# Patient Record
Sex: FEMALE | Race: WHITE | NOT HISPANIC OR LATINO | Employment: UNEMPLOYED | URBAN - METROPOLITAN AREA
[De-identification: names, ages, dates, MRNs, and addresses within clinical notes are randomized per-mention and may not be internally consistent; named-entity substitution may affect disease eponyms.]

---

## 2018-02-16 ENCOUNTER — OFFICE VISIT (OUTPATIENT)
Dept: INTERNAL MEDICINE | Facility: CLINIC | Age: 28
End: 2018-02-16

## 2018-02-16 VITALS
SYSTOLIC BLOOD PRESSURE: 112 MMHG | BODY MASS INDEX: 27.44 KG/M2 | DIASTOLIC BLOOD PRESSURE: 72 MMHG | HEIGHT: 69 IN | WEIGHT: 185.25 LBS | OXYGEN SATURATION: 99 % | HEART RATE: 68 BPM

## 2018-02-16 DIAGNOSIS — G62.9 PERIPHERAL POLYNEUROPATHY: ICD-10-CM

## 2018-02-16 DIAGNOSIS — R51.9 NONINTRACTABLE EPISODIC HEADACHE, UNSPECIFIED HEADACHE TYPE: Primary | ICD-10-CM

## 2018-02-16 PROCEDURE — 99214 OFFICE O/P EST MOD 30 MIN: CPT | Performed by: NURSE PRACTITIONER
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
## 2018-03-01 ENCOUNTER — HOSPITAL ENCOUNTER (OUTPATIENT)
Dept: INFUSION THERAPY | Facility: HOSPITAL | Age: 28
Discharge: HOME | End: 2018-03-01
Attending: PSYCHIATRY & NEUROLOGY | Admitting: PSYCHIATRY & NEUROLOGY

## 2018-03-01 DIAGNOSIS — G62.9 PERIPHERAL POLYNEUROPATHY: ICD-10-CM

## 2018-03-01 PROCEDURE — 95886 MUSC TEST DONE W/N TEST COMP: CPT

## 2018-03-01 PROCEDURE — 95886 MUSC TEST DONE W/N TEST COMP: CPT | Performed by: PSYCHIATRY & NEUROLOGY

## 2018-03-01 PROCEDURE — 95911 NRV CNDJ TEST 9-10 STUDIES: CPT | Performed by: PSYCHIATRY & NEUROLOGY

## 2018-03-01 PROCEDURE — 95911 NRV CNDJ TEST 9-10 STUDIES: CPT

## 2018-03-01 NOTE — PROCEDURES
EMG and Nerve Conduction Studies    Please see data sheets for details on methods, temperatures and lab standards. EMG muscles tested for upper extremity studies include the deltoid, biceps, triceps, pronator teres, extensor digitorum communis, first dorsal interosseous and abductor pollicis brevis.  EMG muscles tested for lower extremity studies include the vastus lateralis, tibialis anterior, peroneus longus, medial gastrocnemius and extensor digitorum brevis.  Additional muscles tested as needed.  Paraspinal muscles tested as needed.  The complete report includes the data sheets.    Indication: Numbness and tingling in the arms particularly with certain shoulder positions  History: 27-year-old white female with numbness and tingling in her arms especially with arms up past horizontal with associated shoulder pain.  She wakes with her arms numb frequently      Ht: 68.5 inches  Wt: 185 pounds  HbA1C: No results found for: HGBA1C  TSH: No results found for: TSH    Technical summary:  Nerve conduction studies were obtained in both arms.  Hands were cold prior to study and so there were warmed in the sink.  Skin temperature was at least 32°C measured on each palm.  Needle examination was obtained on selected muscles in both arms.    Results:  1.  Normal median sensory studies bilaterally.  2.  Normal ulnar sensory studies bilaterally.  3.  Normal right radial sensory study.  4.  Normal median motor studies bilaterally.  5.  Normal ulnar motor studies bilaterally.  6.  Normal needle examination of selected muscles in both arms.    Impression:  Normal study.  No evidence of a median or ulnar neuropathy or cervical radiculopathy on either side by this study.  In addition the standard nerve conductions for a lower trunk plexopathy showed normal median motor amplitudes and normal ulnar sensory amplitudes arguing against lower trunk plexopathies on either side.  Study results were discussed with the patient.    Néstor Woods  M.D.      Addendum: With shoulders fully retracted both radial pulses disappeared.  Other shoulder positions on the right did not occlude the pulse.  On the left with arm vertical or with shoulder externally rotated the radial pulse disappeared which may go along with vascular thoracic outlet syndrome.        Dictated utilizing Dragon dictation.

## 2018-03-13 ENCOUNTER — OFFICE VISIT (OUTPATIENT)
Dept: FAMILY MEDICINE CLINIC | Facility: CLINIC | Age: 28
End: 2018-03-13

## 2018-03-13 VITALS
RESPIRATION RATE: 16 BRPM | HEIGHT: 69 IN | BODY MASS INDEX: 27.11 KG/M2 | WEIGHT: 183 LBS | HEART RATE: 75 BPM | TEMPERATURE: 97.5 F | OXYGEN SATURATION: 99 % | DIASTOLIC BLOOD PRESSURE: 78 MMHG | SYSTOLIC BLOOD PRESSURE: 120 MMHG

## 2018-03-13 DIAGNOSIS — R20.0 LOWER EXTREMITY NUMBNESS: ICD-10-CM

## 2018-03-13 DIAGNOSIS — R20.0 NUMBNESS OF UPPER EXTREMITY: ICD-10-CM

## 2018-03-13 DIAGNOSIS — R29.898 WEAKNESS OF BOTH LOWER EXTREMITIES: Primary | ICD-10-CM

## 2018-03-13 PROCEDURE — 99214 OFFICE O/P EST MOD 30 MIN: CPT | Performed by: FAMILY MEDICINE

## 2018-03-13 PROCEDURE — 72040 X-RAY EXAM NECK SPINE 2-3 VW: CPT | Performed by: FAMILY MEDICINE

## 2018-03-13 PROCEDURE — 72100 X-RAY EXAM L-S SPINE 2/3 VWS: CPT | Performed by: FAMILY MEDICINE

## 2018-03-13 NOTE — PATIENT INSTRUCTIONS
Paresthesia  Paresthesia is a burning or prickling feeling. This feeling can happen in any part of the body. It often happens in the hands, arms, legs, or feet. Usually, it is not painful. In most cases, the feeling goes away in a short time and is not a sign of a serious problem.  Follow these instructions at home:  · Avoid drinking alcohol.  · Try massage or needle therapy (acupuncture) to help with your problems.  · Keep all follow-up visits as told by your doctor. This is important.  Contact a doctor if:  · You keep on having episodes of paresthesia.  · Your burning or prickling feeling gets worse when you walk.  · You have pain or cramps.  · You feel dizzy.  · You have a rash.  Get help right away if:  · You feel weak.  · You have trouble walking or moving.  · You have problems speaking, understanding, or seeing.  · You feel confused.  · You cannot control when you pee (urinate) or poop (bowel movement).  · You lose feeling (numbness) after an injury.  · You pass out (faint).  This information is not intended to replace advice given to you by your health care provider. Make sure you discuss any questions you have with your health care provider.  Document Released: 11/30/2009 Document Revised: 05/25/2017 Document Reviewed: 12/14/2015  Elsevier Interactive Patient Education © 2017 [employer]
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
## 2018-03-21 ENCOUNTER — HOSPITAL ENCOUNTER (OUTPATIENT)
Dept: MRI IMAGING | Facility: HOSPITAL | Age: 28
Discharge: HOME | End: 2018-03-21
Admitting: FAMILY MEDICINE

## 2018-03-21 ENCOUNTER — HOSPITAL ENCOUNTER (OUTPATIENT)
Dept: MRI IMAGING | Facility: HOSPITAL | Age: 28
Discharge: HOME | End: 2018-03-21

## 2018-03-21 DIAGNOSIS — R20.0 LOWER EXTREMITY NUMBNESS: ICD-10-CM

## 2018-03-21 DIAGNOSIS — R20.0 NUMBNESS OF UPPER EXTREMITY: ICD-10-CM

## 2018-03-21 DIAGNOSIS — R29.898 WEAKNESS OF BOTH LOWER EXTREMITIES: ICD-10-CM

## 2018-03-21 PROCEDURE — 72148 MRI LUMBAR SPINE W/O DYE: CPT

## 2018-03-21 PROCEDURE — 72141 MRI NECK SPINE W/O DYE: CPT

## 2018-03-22 ENCOUNTER — APPOINTMENT (OUTPATIENT)
Dept: MRI IMAGING | Facility: HOSPITAL | Age: 28
End: 2018-03-22

## 2018-03-27 DIAGNOSIS — R20.0 NUMBNESS AND TINGLING OF BOTH LOWER EXTREMITIES: Primary | ICD-10-CM

## 2018-03-27 DIAGNOSIS — R29.898 WEAKNESS OF BOTH LOWER LIMBS: ICD-10-CM

## 2018-03-27 DIAGNOSIS — R20.2 NUMBNESS AND TINGLING OF BOTH LOWER EXTREMITIES: Primary | ICD-10-CM

## 2018-04-06 ENCOUNTER — HOSPITAL ENCOUNTER (OUTPATIENT)
Dept: MRI IMAGING | Facility: HOSPITAL | Age: 28
Discharge: HOME | End: 2018-04-06
Admitting: FAMILY MEDICINE

## 2018-04-06 DIAGNOSIS — R20.2 NUMBNESS AND TINGLING OF BOTH LOWER EXTREMITIES: ICD-10-CM

## 2018-04-06 DIAGNOSIS — R20.0 NUMBNESS AND TINGLING OF BOTH LOWER EXTREMITIES: ICD-10-CM

## 2018-04-06 DIAGNOSIS — R29.898 WEAKNESS OF BOTH LOWER LIMBS: ICD-10-CM

## 2018-04-06 PROCEDURE — 72146 MRI CHEST SPINE W/O DYE: CPT

## 2018-04-09 DIAGNOSIS — R20.0 NUMBNESS AND TINGLING: ICD-10-CM

## 2018-04-09 DIAGNOSIS — R20.2 NUMBNESS AND TINGLING: ICD-10-CM

## 2018-04-09 DIAGNOSIS — R29.898 WEAKNESS OF LOWER EXTREMITY, UNSPECIFIED LATERALITY: Primary | ICD-10-CM

## 2018-04-25 NOTE — PROGRESS NOTES
Subjective   Patient ID: Esthela Echevarria is a 27 y.o. female is being seen for consultation today at the request of Ramon Odonnell MD for back pain, numbness and tingling in bilateral upper extremity's    History of Present Illness    This patient has been having pain and numbness in both her arms and her legs as well as pain in her back for some time.  She had an MRI of her lumbar spine in 2016 and at that time was told that she had a disc herniation at L4 5.  Subsequent to that her symptoms gotten worse over the ensuing 2 years.  This particular set of symptoms has been getting worse since the first of this year.  The pain is located in her neck, her thoracic spine, and her lumbar spine.  She also has numbness and tingling in her arms and her hands and in both of her legs but worse on the right than the left.  She is also noticed some numbness in her perineum and says that during sexual relations she doesn't have a lot of feeling.  She doesn't have any difficulty with bowel and bladder control.  All of this is been going on since at least the first of this year.  Activity seems to make her pain worse as well as her numbness and tingling.  She has had no specific treatment for this.    The following portions of the patient's history were reviewed and updated as appropriate: allergies, current medications, past family history, past medical history, past social history, past surgical history and problem list.    Review of Systems   Constitutional: Positive for appetite change.   Respiratory: Negative for chest tightness and shortness of breath.    Cardiovascular: Negative for chest pain.   Gastrointestinal: Positive for constipation.   Endocrine: Positive for cold intolerance.   Musculoskeletal: Positive for arthralgias, back pain, gait problem, myalgias, neck pain and neck stiffness.   Allergic/Immunologic: Positive for environmental allergies.   Neurological: Positive for dizziness, light-headedness, numbness and  headaches.   Hematological: Bruises/bleeds easily.   Psychiatric/Behavioral: Positive for agitation, dysphoric mood and sleep disturbance. The patient is nervous/anxious.    All other systems reviewed and are negative.      Objective   Physical Exam   Constitutional: She is oriented to person, place, and time. She appears well-developed and well-nourished.   HENT:   Head: Normocephalic and atraumatic.   Eyes: Conjunctivae and EOM are normal. Pupils are equal, round, and reactive to light.   Fundoscopic exam:       The right eye shows no papilledema. The right eye shows venous pulsations.        The left eye shows no papilledema. The left eye shows venous pulsations.   Neck: Carotid bruit is not present.   Neurological: She is oriented to person, place, and time. She has a normal Finger-Nose-Finger Test and a normal Heel to Shin Test. Gait normal.   Reflex Scores:       Tricep reflexes are 2+ on the right side and 2+ on the left side.       Bicep reflexes are 2+ on the right side and 2+ on the left side.       Brachioradialis reflexes are 2+ on the right side and 2+ on the left side.       Patellar reflexes are 2+ on the right side and 2+ on the left side.       Achilles reflexes are 2+ on the right side and 2+ on the left side.  Psychiatric: Her speech is normal.     Neurologic Exam     Mental Status   Oriented to person, place, and time.   Registration of memory: Good recent and remote memory.   Attention: normal. Concentration: normal.   Speech: speech is normal   Level of consciousness: alert  Knowledge: consistent with education.     Cranial Nerves     CN II   Visual fields full to confrontation.   Visual acuity: normal    CN III, IV, VI   Pupils are equal, round, and reactive to light.  Extraocular motions are normal.     CN V   Facial sensation intact.   Right corneal reflex: normal  Left corneal reflex: normal    CN VII   Facial expression full, symmetric.   Right facial weakness: none  Left facial weakness:  none    CN VIII   Hearing: intact    CN IX, X   Palate: symmetric    CN XI   Right sternocleidomastoid strength: normal  Left sternocleidomastoid strength: normal    CN XII   Tongue: not atrophic  Tongue deviation: none    Motor Exam   Muscle bulk: normal  Right arm tone: normal  Left arm tone: normal  Right leg tone: normal  Left leg tone: normal    Strength   Strength 5/5 except as noted.     Sensory Exam   Light touch normal.     Gait, Coordination, and Reflexes     Gait  Gait: normal    Coordination   Finger to nose coordination: normal  Heel to shin coordination: normal    Reflexes   Right brachioradialis: 2+  Left brachioradialis: 2+  Right biceps: 2+  Left biceps: 2+  Right triceps: 2+  Left triceps: 2+  Right patellar: 2+  Left patellar: 2+  Right achilles: 2+  Left achilles: 2+  Right : 2+  Left : 2+      Assessment/Plan   Independent Review of Radiographic Studies:      I reviewed an MRI of her thoracic spine, cervical spine, and lumbar spine all done earlier this year myself.  The MRI of the thoracic spine was done on 6 April.  This does show some disc bulging at several levels but there is a fairly large disc herniation at T11-T12 with some cord compression.  There is several other levels where there is some disc bulging to one side or the other but no significant compression of the neural elements.  The disc at T11 T12 is a little more to the left.  The cervical MRI shows some disc bulging at several levels and some disc degeneration but no significant compression of the neural elements.  Both this study and the lumbar study were done on 21 March.  The lumbar MRI also shows multiple levels of disc bulging.  There is a central disc bulge at L3 4 causing fairly significant spinal stenosis and a disc bulge to the right side at L4 5.  L5-S1 also shows some central narrowing.    I also reviewed an MRI of her lumbar spine that is in the system that was done in December 2016.  I compared it directly to  the MRI that was done last month.  The disc at T11-T12 can be seen on both scans and looks to be about the same as it was in 2016.  There was also a disc herniation to the right side at L4 5 which seems to gotten smaller on this one.  This has gotten worse on the left however.    Medical Decision Making:      I told the patient and her  about the exam and the MRI.  I am not sure why she has numbness in her hands and her arms because her cervical MRI is pretty normal.  On the other hand I am very concerned that the perineal numbness may be coming from cord compression at T11-T12.  I explained to them that I did not do a perineal exam as we are not set up for that in this office.  Also I don't think it would add much to what we already know.  I suggested that it makes some sense to go ahead and do a total spine myelogram at this point just to see if the compression at T11-T12 is really as bad as it appears to be on the MRI.  If so then I think she will require surgery at that level.  If not then we might avoid it and we might find another reason for the problem on the lumbar portion of the MRI.  Since the numbness in the hands and arms is not explained on the MRI I think we need to look at the neck as well.    I told the patient what a myelogram involves.  I explained that there is a 50% chance of developing a bad headache and nausea as a result of the test.  I explained that there is also a very small chance of infection, seizures, and bleeding.  I explained how we would treat a post myelogram headache including bedrest, caffeinated fluids, steroids, and blood patch.  The patient does ask to proceed.    Esthela was seen today for back pain and neck pain.    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

## 2018-04-26 ENCOUNTER — OFFICE VISIT (OUTPATIENT)
Dept: NEUROSURGERY | Facility: CLINIC | Age: 28
End: 2018-04-26

## 2018-04-26 VITALS
HEART RATE: 84 BPM | SYSTOLIC BLOOD PRESSURE: 123 MMHG | DIASTOLIC BLOOD PRESSURE: 83 MMHG | WEIGHT: 183 LBS | BODY MASS INDEX: 26.2 KG/M2 | HEIGHT: 70 IN

## 2018-04-26 DIAGNOSIS — M51.04 INTERVERTEBRAL DISC DISORDER OF THORACIC REGION WITH MYELOPATHY: ICD-10-CM

## 2018-04-26 DIAGNOSIS — M48.061 SPINAL STENOSIS OF LUMBAR REGION WITHOUT NEUROGENIC CLAUDICATION: ICD-10-CM

## 2018-04-26 DIAGNOSIS — M54.12 CERVICAL RADICULITIS: Primary | ICD-10-CM

## 2018-04-26 PROCEDURE — 99245 OFF/OP CONSLTJ NEW/EST HI 55: CPT | Performed by: NEUROLOGICAL SURGERY

## 2018-04-26 RX ORDER — DEXAMETHASONE 4 MG/1
TABLET ORAL
Qty: 2 TABLET | Refills: 0 | Status: SHIPPED | OUTPATIENT
Start: 2018-04-26 | End: 2018-05-08 | Stop reason: HOSPADM

## 2018-05-08 ENCOUNTER — HOSPITAL ENCOUNTER (OUTPATIENT)
Dept: CT IMAGING | Facility: HOSPITAL | Age: 28
Discharge: HOME | End: 2018-05-08
Attending: NEUROLOGICAL SURGERY | Admitting: NEUROLOGICAL SURGERY

## 2018-05-08 ENCOUNTER — HOSPITAL ENCOUNTER (OUTPATIENT)
Dept: GENERAL RADIOLOGY | Facility: HOSPITAL | Age: 28
Discharge: HOME | End: 2018-05-08
Attending: NEUROLOGICAL SURGERY

## 2018-05-08 VITALS
SYSTOLIC BLOOD PRESSURE: 102 MMHG | DIASTOLIC BLOOD PRESSURE: 62 MMHG | HEART RATE: 63 BPM | WEIGHT: 185 LBS | BODY MASS INDEX: 27.4 KG/M2 | HEIGHT: 69 IN | OXYGEN SATURATION: 98 % | TEMPERATURE: 99.1 F | RESPIRATION RATE: 18 BRPM

## 2018-05-08 DIAGNOSIS — M48.061 SPINAL STENOSIS OF LUMBAR REGION WITHOUT NEUROGENIC CLAUDICATION: ICD-10-CM

## 2018-05-08 DIAGNOSIS — M51.04 INTERVERTEBRAL DISC DISORDER OF THORACIC REGION WITH MYELOPATHY: ICD-10-CM

## 2018-05-08 DIAGNOSIS — M54.12 CERVICAL RADICULITIS: ICD-10-CM

## 2018-05-08 PROCEDURE — 72052 X-RAY EXAM NECK SPINE 6/>VWS: CPT

## 2018-05-08 PROCEDURE — 25010000002 IOPAMIDOL 61 % SOLUTION: Performed by: NEUROLOGICAL SURGERY

## 2018-05-08 PROCEDURE — 72128 CT CHEST SPINE W/O DYE: CPT

## 2018-05-08 PROCEDURE — 72270 MYELOGPHY 2/> SPINE REGIONS: CPT

## 2018-05-08 PROCEDURE — 72114 X-RAY EXAM L-S SPINE BENDING: CPT

## 2018-05-08 PROCEDURE — 72125 CT NECK SPINE W/O DYE: CPT

## 2018-05-08 PROCEDURE — 72131 CT LUMBAR SPINE W/O DYE: CPT

## 2018-05-08 RX ORDER — ACETAMINOPHEN 325 MG/1
650 TABLET ORAL EVERY 4 HOURS PRN
Status: DISCONTINUED | OUTPATIENT
Start: 2018-05-08 | End: 2018-05-09 | Stop reason: HOSPADM

## 2018-05-08 RX ORDER — HYDROCODONE BITARTRATE AND ACETAMINOPHEN 5; 325 MG/1; MG/1
1 TABLET ORAL EVERY 4 HOURS PRN
Status: DISCONTINUED | OUTPATIENT
Start: 2018-05-08 | End: 2018-05-09 | Stop reason: HOSPADM

## 2018-05-08 RX ORDER — LIDOCAINE HYDROCHLORIDE 10 MG/ML
10 INJECTION, SOLUTION INFILTRATION; PERINEURAL ONCE
Status: COMPLETED | OUTPATIENT
Start: 2018-05-08 | End: 2018-05-08

## 2018-05-08 RX ADMIN — LIDOCAINE HYDROCHLORIDE 8 ML: 10 INJECTION, SOLUTION INFILTRATION; PERINEURAL at 07:47

## 2018-05-08 RX ADMIN — ACETAMINOPHEN 650 MG: 325 TABLET ORAL at 09:21

## 2018-05-08 RX ADMIN — IOPAMIDOL 15 ML: 612 INJECTION, SOLUTION INTRATHECAL at 07:49

## 2018-05-08 NOTE — DISCHARGE INSTRUCTIONS
EDUCATION /DISCHARGE INSTRUCTIONS:  A myelogram is a special radiology procedure of the spinal cord, spinal nerves and other related structures.  You will be awake during the examination.  An area of your lower back will be cleansed with an antiseptic solution.  The physician will inject a numbing medication in your lower back.  While your back is numb, a needle will be placed in the lower back area.  A small amount of spinal fluid may be withdrawn and sent to the lab if ordered by your physician. While the needle is in the back, an injection of a contrast material (xray dye) will be given through the needle.  The contrast material will allow the physician to see the spinal cord and spinal nerves.  Once injected, the needle will be removed and a band aid will be placed over the injection site.  The table will be tilted during the process to allow the contrast material to flow to particular areas in the spine.  Following the injection and xrays, you will be taken to the CT scan where more pictures will be taken. After the procedure is finished, the contrast material will be absorbed by your body and eliminated through your kidneys.  The radiologist will study and interpret your myelogram and send the results to your physician.    Procedure risks of a myelogram include, but are not limited to:  *  Bleeding   *  seizure  *  Infection   *  Headache, possibly severe requiring  *  Contrast reaction      a blood patch  *  Nerve or cord injury  *  Paralysis and death    Benefits of the procedure:  *  Best examination for delineating pathology related to spinal cord compression from a disc and/or nerve root compression    Alternatives to the procedure:  MRI - a non invasive procedure requiring intravenous contrast injection.  Cannot be done on patients with certain pacemakers or metal in the body.  MRI risks include possible reaction to the contrast material, movement of metal located in the body.  Benefit to MRI:   Non-invasive and usually painless procedure.  THIS EDUCATION INFORMATION WAS REVIEWED PRIOR TO PROCEDURE AND CONSENT. Patient initials________________Time__0710________________    Important information following your myelogram:  *  Lie down with your head elevated no more than 2 pillows high today & tonight  *  Tomorrow, lie down with 1 pillow all day and all night  *  Sit up to eat meals and use the bathroom, otherwise, lie down  *  Do not drive for 48 hours following a myelogram  *  You may remove the bandage and shower in the morning  *  Increase your fluids for the next 24 hours.  Caffeinated drinks are encouraged.      Resume taking blood thinner or aspirin on _No Aspirin for 24 hours after the myelogram_    CALL YOUR PHYSICIAN FOR THE FOLLOWING:  * Pain at the injections site  * Reddness, swelling, bruising or drainage at the injection site.  * A fever by mouth of 101.0  * Any new symptoms    Headaches are a common side effect after a myelogram.  If you get a headache, you should stay flat in bed and drink plenty of fluids. If the headache persist and does not go away with rest/medication, CALL Dr. Garces at [phone]

## 2018-05-09 ENCOUNTER — TELEPHONE (OUTPATIENT)
Dept: INTERVENTIONAL RADIOLOGY/VASCULAR | Facility: HOSPITAL | Age: 28
End: 2018-05-09

## 2018-05-18 NOTE — PROGRESS NOTES
Subjective   Patient ID: Esthela Echevarria is a 27 y.o. female is here today for follow-up with a new Total Spine Myelogram ordered for neck pain, back pain, numbness and tingling in bilateral upper extremity's.    History of Present Illness    This patient returns today.  She is doing well overall.  She does still have some pain but it is reasonably tolerable at this point.  She does still have the perineal numbness.    The following portions of the patient's history were reviewed and updated as appropriate: allergies, current medications, past family history, past medical history, past social history, past surgical history and problem list.    Review of Systems   Constitutional: Positive for activity change.   Respiratory: Negative for chest tightness and shortness of breath.    Cardiovascular: Negative for chest pain.   Musculoskeletal: Positive for back pain, gait problem, myalgias, neck pain and neck stiffness.   Neurological: Positive for dizziness, numbness and headaches.        Tingling   All other systems reviewed and are negative.      Objective   Physical Exam   Constitutional: She is oriented to person, place, and time. She appears well-developed and well-nourished.   Neurological: She is oriented to person, place, and time.     Neurologic Exam     Mental Status   Oriented to person, place, and time.       Assessment/Plan   Independent Review of Radiographic Studies:      I reviewed her plain films, myelogram, and CT scan myself.  He was imaging of her entire spine.  The plain films or in the cervical and lumbar spine.  In the myelogram and CT's or of the entire spine.  On the myelogram itself there is a conjoined nerve root on the right side at L4 5 but the nerves all fill out pretty well.  On the post myelographic CT scan of the cervical spine C2 3, C3 4, C4 5, C5 6, and C6 7 all look okay as does C7-T1.  There are degenerative changes but no evidence of neural compression.  On the post myelographic CT of  the thoracic spine there is a disc herniation on the left side at T3 4 but it is not compressing the cord.  There are degenerative changes at several levels and a little flattening of the cord at T6 7 from disc bulging and spondylitic change.  This is also true at T7-T8.  There is a central disc bulge at T11-T12 which is distorting the lower cord but not compressing it.  There is a disc herniation to the right side at T12-L1.  L1 2 and L2-3 look okay.  L3 4 shows some central disc bulging to the left side which is compressing the thecal sac and L4 5 shows a central disc bulge as well with a little lateral recess stenosis.  L5-S1 mostly looks okay.    Medical Decision Making:      I told the patient and her  about the test.  I told them that from my point of view I would not recommend any surgery.  The perineal numbness may be from the disc at T11-T12 but I don't think it is dangerous and I don't think it is putting her at risk of cauda equina syndrome.  I did suggest that she try some physical therapy.  I told her to call me if anything gets worse I told her to be very careful about gaining weight.  She will call if anything gets worse.    Esthela was seen today for back pain and neck pain.    Diagnoses and all orders for this visit:    Spinal stenosis of lumbar region without neurogenic claudication  -     Ambulatory Referral to Physical Therapy      Return if symptoms worsen or fail to improve.

## 2018-05-22 ENCOUNTER — OFFICE VISIT (OUTPATIENT)
Dept: ORTHOPEDIC SURGERY | Facility: CLINIC | Age: 28
End: 2018-05-22

## 2018-05-22 DIAGNOSIS — M54.6 CHRONIC THORACIC BACK PAIN, UNSPECIFIED BACK PAIN LATERALITY: ICD-10-CM

## 2018-05-22 DIAGNOSIS — M25.50 ARTHRALGIA OF MULTIPLE SITES: Primary | ICD-10-CM

## 2018-05-22 DIAGNOSIS — M48.061 SPINAL STENOSIS OF LUMBAR REGION WITHOUT NEUROGENIC CLAUDICATION: ICD-10-CM

## 2018-05-22 DIAGNOSIS — G89.29 CHRONIC THORACIC BACK PAIN, UNSPECIFIED BACK PAIN LATERALITY: ICD-10-CM

## 2018-05-22 DIAGNOSIS — M47.22 CERVICAL SPONDYLOSIS WITH RADICULOPATHY: ICD-10-CM

## 2018-05-22 PROCEDURE — 99205 OFFICE O/P NEW HI 60 MIN: CPT | Performed by: ORTHOPAEDIC SURGERY

## 2018-05-22 NOTE — PROGRESS NOTES
New patient or new problem visit    Chief Complaint   Patient presents with   • Cervical Spine - Establish Care, Numbness   • Lumbar Spine - Establish Care, Numbness       HPI: She is seen today request of Dr. Garces primarily for lumbar back pain.  Was tremendously worsened in the peripartum period of the delivering her second child who is now 8 months old pain is been ongoing for about 2 years.  Early in the pregnancy she was actually better.  The pain radiates proximally and distally from the lumbar spine she notes numbness and tingling in the arms and legs and has also complaint of chronic migraines.  Her pain varies from mild to severe intermittent constant and is grinding stabbing aching and burning and worse with activity.  She is tried anti-inflammatory agents ice heat and rest PT in yoga all without relief.    PFSH: See chart- reviewed.  She is seemingly otherwise healthy and there appears to be a family history of some early back complaints.    Review of Systems   HENT: Positive for postnasal drip.    Musculoskeletal: Positive for arthralgias and myalgias.   Neurological: Positive for dizziness, numbness and headaches.   Psychiatric/Behavioral: Positive for sleep disturbance. The patient is nervous/anxious.        PE: Constitutional: Vital signs above-noted.  Awake, alert and oriented she appears to be of average size and satisfactory fitness    Psychiatric: Affect and insight do not appear grossly disturbed.    Pulmonary: Breathing is unlabored, color is good.    Skin: Warm, dry and normal turgor    Cardiac:  pedal pulses intact.  No edema.    Eyesight and hearing appear adequate for examination purposes      Musculoskeletal:  There is mild tenderness to percussion and palpation of the spine. Motion appears somewhat stiff.  Posture is unremarkable to coronal and sagittal inspection.    The skin about the area is intact.  There is no palpable or visible deformity.  There is no local spasm.       Neurologic:    Reflexes are 2+ and symmetrical in the patellae and achilles.   Motor function is undisturbed in quadriceps, EHL, and gastrocnemius      Sensation appears symmetrically intact to light touch   .  In the bilateral lower extremities there is no evidence of atrophy.   Clonus is absent..  Gait appears undisturbed.  SLR test negative      MEDICAL DECISION MAKING    XRAY: There is a plethora of x-rays: Plain film x-rays of the cervical spine are normal.  I agree with the radiologist report.  Plain film x-rays the lumbar spine show some retrolisthesis at L4 5 and a tiny bit at L3 4.  The radiologist did not mention the amount at L3 4.  No instability on bending films.  Myelogram and CT scan demonstrate the T7 8 disc prominence and abutment against the cord, and then some T11 12 moderate stenosis and cord abutment with the slight deformity of the cord.  Cervical myelogram CT is fairly unremarkable.  Lumbar demonstrates moderate stenosis at L3-4 and L4-5 and looks good otherwise.  I agree with the radiologist reports on these.  Finally MRI scans of the cervical thoracic and lumbar spine are reviewed.  Cervical spine again is fairly unremarkable.  Thoracic spine shows multiple levels of disc protrusion of mild extent and then a moderate stenosis at T11 12 but without evidence of the cord signal change.  At L3-4 and L4-5 there is T2 diminished signal intensity change the midst otherwise fairly normal looking disc and then there is a moderate stenosis at L3-4 and L4-5.  I reviewed the radiologist's report which is lengthy and seems to agree with my interpretation.    Other: n/a    Impression: This is a otherwise healthy 27-year-old who takes an interest in an responsibility for her own health.  Unfortunately she has inherited remarkable degenerative disc disease which is causing multiple thoracic and lumbar manifestations.  Her recent pregnancies and elevated the hormones area and have probably contributed to further  exacerbation of her symptoms.  For now she has some quality in her life, and all her symptoms are daily aggravating and annoying and prevent her from enjoying normal 27-year-old activities.  She has moderate stenosis in the lower thoracic and then again at the L3-4 L4 5 area.  Probably the likelihood for dramatic neurologic deterioration isn't much different from the small risk of serious complication from spine surgery intervention as well as starting down the road toward further, inevitable adjacent level spinal interventions.    Plan: For now continue cardiovascular activity, call me or Dr. Garces for any neurologic worsening or intractable back pain, and I will check a panel including rheumatoid factor, A N/A uric acid and sedimentation rate as she has some other musculoskeletal mild complaints which may be part of a larger inflammatory background.  At some point if surgery is selected I suppose starting with L3-4 and L4-5 decompression and fusion would be best.

## 2018-05-31 ENCOUNTER — OFFICE VISIT (OUTPATIENT)
Dept: NEUROSURGERY | Facility: CLINIC | Age: 28
End: 2018-05-31

## 2018-05-31 VITALS — HEART RATE: 89 BPM | SYSTOLIC BLOOD PRESSURE: 130 MMHG | DIASTOLIC BLOOD PRESSURE: 80 MMHG

## 2018-05-31 DIAGNOSIS — M48.061 SPINAL STENOSIS OF LUMBAR REGION WITHOUT NEUROGENIC CLAUDICATION: Primary | ICD-10-CM

## 2018-05-31 PROBLEM — M51.04 INTERVERTEBRAL DISC DISORDER OF THORACIC REGION WITH MYELOPATHY: Status: RESOLVED | Noted: 2018-04-26 | Resolved: 2018-05-31

## 2018-05-31 PROCEDURE — 99213 OFFICE O/P EST LOW 20 MIN: CPT | Performed by: NEUROLOGICAL SURGERY

## 2018-10-19 DIAGNOSIS — M25.50 ARTHRALGIA OF MULTIPLE SITES: ICD-10-CM

## 2018-10-19 DIAGNOSIS — M25.50 ARTHRALGIA OF MULTIPLE SITES: Primary | ICD-10-CM

## 2018-10-22 LAB
ANA TITR SER IF: POSITIVE {TITER}
ERYTHROCYTE [SEDIMENTATION RATE] IN BLOOD BY WESTERGREN METHOD: 4 MM/HR (ref 0–20)
Lab: NORMAL
MITOTIC SPINDLE APP AB TITR SERPL IF: NORMAL {TITER}
RHEUMATOID FACT SERPL-ACNC: <10 IU/ML (ref 0–13.9)
URATE SERPL-MCNC: 6.4 MG/DL (ref 2.4–5.7)

## 2018-10-24 ENCOUNTER — TELEPHONE (OUTPATIENT)
Dept: ORTHOPEDIC SURGERY | Facility: CLINIC | Age: 28
End: 2018-10-24

## 2018-10-24 DIAGNOSIS — M25.50 ARTHRALGIA OF MULTIPLE JOINTS: Primary | ICD-10-CM

## 2018-10-24 NOTE — TELEPHONE ENCOUNTER
Patient called requesting results of labs drawn 10/19/18.     Informed [facility] out of office till 10/30. Unsure if she needs FU appt or if results can be discussed via phone. Patient can be reached at [phone]. Thanks /srh

## 2018-10-25 NOTE — TELEPHONE ENCOUNTER
Spoken with the patient regarding her lab results.  Sedimentation rate and rheumatoid factor were normal however her NA was positive with a ratio of 1:80.  Uric acid was also slightly high at 6.4.  Have discussed with the patient that she could follow-up with her PCP but may eventually end up needing to see a rheumatologist.  And would prefer to wait until ABIDA ALEMAN return to the office to talk to him about what he prefers.  Patient also inquired about whether or not she needed to have thyroid studies done since she does have a family history of thyroid disease.  Cussed with ABIDA ALEMAN when he returns to the office and then get back with the patient

## 2018-10-29 NOTE — TELEPHONE ENCOUNTER
Call return to the patient.  Have advised her that ABIDA ALEMAN has reviewed her labs and would recommend that she see a rheumatologist.  She can either follow-up with the rheumatologist or her PCP regarding her thyroid levels.  Patient would prefer to go ahead and make the appointment with the rheumatologist.  Referral has been ordered

## 2018-10-29 NOTE — TELEPHONE ENCOUNTER
She may eventually need spinal surgery but for now get her in to see a rheumatologist.  She should discuss the thyroid function tests with the rheumatologist or her family md.

## 2018-12-27 ENCOUNTER — TELEPHONE (OUTPATIENT)
Dept: FAMILY MEDICINE CLINIC | Facility: CLINIC | Age: 28
End: 2018-12-27

## 2018-12-27 RX ORDER — FLUCONAZOLE 150 MG/1
150 TABLET ORAL ONCE
Qty: 2 TABLET | Refills: 0 | Status: SHIPPED | OUTPATIENT
Start: 2018-12-27 | End: 2018-12-27

## 2018-12-27 NOTE — TELEPHONE ENCOUNTER
I have sent in the prescription for her.  If it does not improve, please make an appointment with me.

## 2018-12-27 NOTE — TELEPHONE ENCOUNTER
Patient called she has a yeast infection and she was hoping Dr Odonnell would call in something to [facility] [phone], her best call back is [phone]

## 2020-04-08 ENCOUNTER — DOCUMENTATION (OUTPATIENT)
Dept: NEUROSURGERY | Facility: CLINIC | Age: 30
End: 2020-04-08

## 2020-04-08 NOTE — PROGRESS NOTES
[facility] faxed request for all Medical Records for this patient. All records pertaining to Dr. Garces was faxed to requested number.

## 2021-04-16 ENCOUNTER — BULK ORDERING (OUTPATIENT)
Dept: CASE MANAGEMENT | Facility: OTHER | Age: 31
End: 2021-04-16

## 2021-04-16 DIAGNOSIS — Z23 IMMUNIZATION DUE: ICD-10-CM
